# Patient Record
Sex: MALE | Race: WHITE
[De-identification: names, ages, dates, MRNs, and addresses within clinical notes are randomized per-mention and may not be internally consistent; named-entity substitution may affect disease eponyms.]

---

## 2019-08-16 ENCOUNTER — HOSPITAL ENCOUNTER (EMERGENCY)
Dept: HOSPITAL 26 - MED | Age: 39
Discharge: LEFT BEFORE DISCHARGE | End: 2019-08-16
Payer: MEDICAID

## 2019-08-16 VITALS — DIASTOLIC BLOOD PRESSURE: 80 MMHG | SYSTOLIC BLOOD PRESSURE: 121 MMHG

## 2019-08-16 VITALS — BODY MASS INDEX: 23.49 KG/M2 | WEIGHT: 203 LBS | HEIGHT: 78 IN

## 2019-08-16 VITALS — SYSTOLIC BLOOD PRESSURE: 118 MMHG | DIASTOLIC BLOOD PRESSURE: 62 MMHG

## 2019-08-16 DIAGNOSIS — M79.605: ICD-10-CM

## 2019-08-16 DIAGNOSIS — H54.61: ICD-10-CM

## 2019-08-16 DIAGNOSIS — M79.604: Primary | ICD-10-CM

## 2019-08-16 PROCEDURE — 96372 THER/PROPH/DIAG INJ SC/IM: CPT

## 2019-08-16 PROCEDURE — 99283 EMERGENCY DEPT VISIT LOW MDM: CPT

## 2019-08-16 NOTE — NUR
PT BIBA TO ED FOR EVALUATION OF BL LEG PAIN. PT STATED BEEN WALKING A LOT, BL 
LEG CHRONIC PAIN. PT STATED DONT KNOW HIS OWN NAME, POOR HISTORY, UNABLE TO 
OBTAIN PMH. RT EYE BLIND. PER PT, HE HASN'T BEEN ABLE TO WALK GOOD FOR 11 
MONTHS. PT DENIES TRAUMA/INJURY. HOB UP. BED SIDE RAILS UP X1. ON LOW BED 
POSITION, LOCKED. MD ER TO EVALUATE PT.

## 2019-08-16 NOTE — NUR
-------------------------------------------------------------------------------

            *** Note undone in [facility] - 08/16/19 at 1428 by [facility] ***             

-------------------------------------------------------------------------------

PT BIB [facility] TO ER BED 12

## 2019-08-16 NOTE — NUR
PT SCREAMING HE WANTS TO LEAVE;  APPROACHED PT TWICE AND REQUESTED FOR HIM NOT 
TO SCREAM.  

PROVIDED FOOD AND JUICE AND HAD SECURITY TALK TO PT AFTER THE SECOND SCREAMING 
OCCURENCE --PT AMBULATED OUT OF THE ER WITH STEADY GAIT